# Patient Record
(demographics unavailable — no encounter records)

---

## 2024-11-16 NOTE — ASSESSMENT
[FreeTextEntry1] : Follow up  Dry skin cont Lachydrin cream  Hx of acne  Doxycycline Monohydrate 100mg BID  HTN cont Amlodipine 5 mg daily cont Lisinopril 20 mg daily Reinforced the importance of following a low sodium diet  Elevated cholesterol reinforced importance of following a low cholesterol/low fat diet  Low vit D  cont vit D [id number] weekly - renewed today

## 2024-11-16 NOTE — ADDENDUM
[FreeTextEntry1] : Documented by Moon Arana acting as a scribe for Dr. Mechelle Estes. 11/13/2024   All medical record entries made by the scribe were at my, Dr. Mechelle Estes, direction and personally dictated by me on 11/13/2024. I have reviewed the chart and agree that the record accurately reflects my personal performance of the history, physical exam, assessment and plan. I have also personally directed, reviewed, and agreed with the chart.

## 2024-11-16 NOTE — HISTORY OF PRESENT ILLNESS
[Home] : at home, [unfilled] , at the time of the visit. [Medical Office: ([facility])___] : at the medical office located in  [Verbal consent obtained from patient] : the patient, [unfilled] [de-identified] : Ms. GISSELLE SANCHEZ is a 59 year old female with Hx of HTN, elevated LDL high risk for DM, seen in telemedicine for a follow up visit, Rx refills.   Pt states she is feeling well. Has been having facial acne again and wants Rx for Doxycycline Denies any SOB, CP, abdominal pain, N/V/D, headache, dizziness, or leg swelling.

## 2024-11-16 NOTE — ASSESSMENT
[FreeTextEntry1] : Follow up  Dry skin cont Lachydrin cream  Hx of acne  Doxycycline Monohydrate 100mg BID  HTN cont Amlodipine 5 mg daily cont Lisinopril 20 mg daily Reinforced the importance of following a low sodium diet  Elevated cholesterol reinforced importance of following a low cholesterol/low fat diet  Low vit D  cont vit D [id number] weekly - renewed today

## 2024-11-16 NOTE — HISTORY OF PRESENT ILLNESS
[Home] : at home, [unfilled] , at the time of the visit. [Medical Office: ([facility])___] : at the medical office located in  [Verbal consent obtained from patient] : the patient, [unfilled] [de-identified] : Ms. GISSELLE SANCHEZ is a 59 year old female with Hx of HTN, elevated LDL high risk for DM, seen in telemedicine for a follow up visit, Rx refills.   Pt states she is feeling well. Has been having facial acne again and wants Rx for Doxycycline Denies any SOB, CP, abdominal pain, N/V/D, headache, dizziness, or leg swelling.

## 2025-05-14 NOTE — ADDENDUM
[FreeTextEntry1] : Documented by Jose Frank acting as a scribe for Dr. Mechelle Estes. 05/07/2025  All medical records entries made by the scribe were at my, Dr. Estes, direction and personally dictated by me on 05/07/2025. I have reviewed the chart and agree that the record accurately reflects my personal performance of the history, physical exam, assessment and plan. I have also personally directed, reviewed, and agreed with the chart.

## 2025-05-14 NOTE — HISTORY OF PRESENT ILLNESS
[FreeTextEntry1] : BUL bundled blepharoplasty and ptosis repair [FreeTextEntry2] : 5/16/2025 [FreeTextEntry3] : Dr. Obinna Perry [FreeTextEntry4] : Ms. GISSELLE SANCHEZ is a 60 year old female with hx of , presenting for a pre-operative medical clearance for a BUL bundled blepharoplasty and ptosis repair.    Pt states she is feeling well. Offers no complaints. Denies any SOB, CP, abdominal pain, N/V/D, headache, dizziness, or leg swelling.   Reports compliance with taking her meds daily.

## 2025-07-14 NOTE — HISTORY OF PRESENT ILLNESS
[de-identified] : Ms. GISSELLE SANCHEZ is a 60 year old female with hx of HTN, elevated LDL high risk for DM, presenting for an annual physical.   Pt is s/p BUL bundled blepharoplasty and ptosis repair. Pt states she is feeling well. Pt c/o veins around her right anterior knee. Denies any SOB, CP, abdominal pain, N/V/D, headache, dizziness, or leg swelling.

## 2025-07-14 NOTE — HEALTH RISK ASSESSMENT
[Yes] : Yes [Monthly or less (1 pt)] : Monthly or less (1 point) [Never (0 pts)] : Never (0 points) [Little interest or pleasure doing things] : 1) Little interest or pleasure doing things [Feeling down, depressed, or hopeless] : 2) Feeling down, depressed, or hopeless [0] : 2) Feeling down, depressed, or hopeless: Not at all (0) [PHQ-2 Negative - No further assessment needed] : PHQ-2 Negative - No further assessment needed [Never] : Never [With Family] : lives with family [Single] : single [# Of Children ___] : has [unfilled] children [de-identified] : occasionally drinks wine [[id number]] : 0 [MammogramDate] : June 6, 2023 [PapSmearComments] : Hx of hysterectomy [BoneDensityDate] : Jan 2022 [ColonoscopyDate] : 2019

## 2025-07-14 NOTE — PHYSICAL EXAM
[No Acute Distress] : no acute distress [Well Nourished] : well nourished [Well Developed] : well developed [Well-Appearing] : well-appearing [Normal Sclera/Conjunctiva] : normal sclera/conjunctiva [PERRL] : pupils equal round and reactive to light [EOMI] : extraocular movements intact [Normal Outer Ear/Nose] : the outer ears and nose were normal in appearance [Normal Oropharynx] : the oropharynx was normal [No JVD] : no jugular venous distention [No Lymphadenopathy] : no lymphadenopathy [Supple] : supple [Thyroid Normal, No Nodules] : the thyroid was normal and there were no nodules present [No Respiratory Distress] : no respiratory distress  [No Accessory Muscle Use] : no accessory muscle use [Clear to Auscultation] : lungs were clear to auscultation bilaterally [Normal Rate] : normal rate  [Regular Rhythm] : with a regular rhythm [Normal S1, S2] : normal S1 and S2 [No Murmur] : no murmur heard [Pedal Pulses Present] : the pedal pulses are present [No Edema] : there was no peripheral edema [No Extremity Clubbing/Cyanosis] : no extremity clubbing/cyanosis [Soft] : abdomen soft [Non Tender] : non-tender [Non-distended] : non-distended [No Masses] : no abdominal mass palpated [No HSM] : no HSM [Normal Bowel Sounds] : normal bowel sounds [Normal Posterior Cervical Nodes] : no posterior cervical lymphadenopathy [Normal Anterior Cervical Nodes] : no anterior cervical lymphadenopathy [No CVA Tenderness] : no CVA  tenderness [No Spinal Tenderness] : no spinal tenderness [No Joint Swelling] : no joint swelling [Grossly Normal Strength/Tone] : grossly normal strength/tone [No Rash] : no rash [Coordination Grossly Intact] : coordination grossly intact [No Focal Deficits] : no focal deficits [Normal Gait] : normal gait [Normal Affect] : the affect was normal [Normal Insight/Judgement] : insight and judgment were intact [de-identified] : +spider veins on right anterior knee

## 2025-07-14 NOTE — HISTORY OF PRESENT ILLNESS
[de-identified] : Ms. GISSELLE SANCHEZ is a 60 year old female with hx of HTN, elevated LDL high risk for DM, presenting for an annual physical.   Pt is s/p BUL bundled blepharoplasty and ptosis repair. Pt states she is feeling well. Pt c/o veins around her right anterior knee. Denies any SOB, CP, abdominal pain, N/V/D, headache, dizziness, or leg swelling.

## 2025-07-14 NOTE — ASSESSMENT
[Vaccines Reviewed] : Immunizations reviewed today. Please see immunization details in the vaccine log within the immunization flowsheet.  [FreeTextEntry1] : annual physical   referred for mammogram and bone density scan no longer goes for PAP ( s/p hysterectomy- as per pt she was told she does not need it ) UTD with colonoscopy  spider veins referred to vascular  Dry skin cont Lachydrin cream  Hx of acne  Doxycycline Monohydrate 100mg BID - renewed today  HTN cont Amlodipine 5 mg daily cont Lisinopril 20 mg daily Reinforced the importance of following a low sodium diet  Elevated cholesterol reinforced importance of following a low cholesterol/low fat diet we'll check lipids and LFT's today   bloodwork ordered follow up in one week for lab results none

## 2025-07-14 NOTE — ADDENDUM
[FreeTextEntry1] : Documented by Jose Frank acting as a scribe for Dr. Mechelle Estes. 07/07/2025  All medical records entries made by the scribe were at my, Dr. Estes, direction and personally dictated by me on 07/07/2025. I have reviewed the chart and agree that the record accurately reflects my personal performance of the history, physical exam, assessment and plan. I have also personally directed, reviewed, and agreed with the chart.

## 2025-07-14 NOTE — HEALTH RISK ASSESSMENT
[Yes] : Yes [Monthly or less (1 pt)] : Monthly or less (1 point) [Never (0 pts)] : Never (0 points) [Little interest or pleasure doing things] : 1) Little interest or pleasure doing things [Feeling down, depressed, or hopeless] : 2) Feeling down, depressed, or hopeless [0] : 2) Feeling down, depressed, or hopeless: Not at all (0) [PHQ-2 Negative - No further assessment needed] : PHQ-2 Negative - No further assessment needed [Never] : Never [With Family] : lives with family [Single] : single [# Of Children ___] : has [unfilled] children [de-identified] : occasionally drinks wine [[id number]] : 0 [MammogramDate] : June 6, 2023 [PapSmearComments] : Hx of hysterectomy [BoneDensityDate] : Jan 2022 [ColonoscopyDate] : 2019

## 2025-07-14 NOTE — PHYSICAL EXAM
[No Acute Distress] : no acute distress [Well Nourished] : well nourished [Well Developed] : well developed [Well-Appearing] : well-appearing [Normal Sclera/Conjunctiva] : normal sclera/conjunctiva [PERRL] : pupils equal round and reactive to light [EOMI] : extraocular movements intact [Normal Outer Ear/Nose] : the outer ears and nose were normal in appearance [Normal Oropharynx] : the oropharynx was normal [No JVD] : no jugular venous distention [No Lymphadenopathy] : no lymphadenopathy [Supple] : supple [Thyroid Normal, No Nodules] : the thyroid was normal and there were no nodules present [No Respiratory Distress] : no respiratory distress  [No Accessory Muscle Use] : no accessory muscle use [Clear to Auscultation] : lungs were clear to auscultation bilaterally [Normal Rate] : normal rate  [Regular Rhythm] : with a regular rhythm [Normal S1, S2] : normal S1 and S2 [No Murmur] : no murmur heard [Pedal Pulses Present] : the pedal pulses are present [No Edema] : there was no peripheral edema [No Extremity Clubbing/Cyanosis] : no extremity clubbing/cyanosis [Soft] : abdomen soft [Non Tender] : non-tender [Non-distended] : non-distended [No Masses] : no abdominal mass palpated [No HSM] : no HSM [Normal Bowel Sounds] : normal bowel sounds [Normal Posterior Cervical Nodes] : no posterior cervical lymphadenopathy [Normal Anterior Cervical Nodes] : no anterior cervical lymphadenopathy [No CVA Tenderness] : no CVA  tenderness [No Spinal Tenderness] : no spinal tenderness [No Joint Swelling] : no joint swelling [Grossly Normal Strength/Tone] : grossly normal strength/tone [No Rash] : no rash [Coordination Grossly Intact] : coordination grossly intact [No Focal Deficits] : no focal deficits [Normal Gait] : normal gait [Normal Affect] : the affect was normal [Normal Insight/Judgement] : insight and judgment were intact [de-identified] : +spider veins on right anterior knee

## 2025-07-14 NOTE — ASSESSMENT
[Vaccines Reviewed] : Immunizations reviewed today. Please see immunization details in the vaccine log within the immunization flowsheet.  [FreeTextEntry1] : annual physical   referred for mammogram and bone density scan no longer goes for PAP ( s/p hysterectomy- as per pt she was told she does not need it ) UTD with colonoscopy  spider veins referred to vascular  Dry skin cont Lachydrin cream  Hx of acne  Doxycycline Monohydrate 100mg BID - renewed today  HTN cont Amlodipine 5 mg daily cont Lisinopril 20 mg daily Reinforced the importance of following a low sodium diet  Elevated cholesterol reinforced importance of following a low cholesterol/low fat diet we'll check lipids and LFT's today   bloodwork ordered follow up in one week for lab results